# Patient Record
Sex: MALE | Race: BLACK OR AFRICAN AMERICAN | NOT HISPANIC OR LATINO | URBAN - METROPOLITAN AREA
[De-identification: names, ages, dates, MRNs, and addresses within clinical notes are randomized per-mention and may not be internally consistent; named-entity substitution may affect disease eponyms.]

---

## 2018-07-21 ENCOUNTER — OFFICE VISIT (OUTPATIENT)
Dept: URGENT CARE | Facility: CLINIC | Age: 48
End: 2018-07-21
Payer: COMMERCIAL

## 2018-07-21 VITALS
TEMPERATURE: 97 F | WEIGHT: 205 LBS | BODY MASS INDEX: 28.7 KG/M2 | HEART RATE: 74 BPM | DIASTOLIC BLOOD PRESSURE: 96 MMHG | SYSTOLIC BLOOD PRESSURE: 143 MMHG | OXYGEN SATURATION: 99 % | HEIGHT: 71 IN

## 2018-07-21 DIAGNOSIS — M25.562 PAIN AND SWELLING OF LEFT KNEE: ICD-10-CM

## 2018-07-21 DIAGNOSIS — M25.462 PAIN AND SWELLING OF LEFT KNEE: ICD-10-CM

## 2018-07-21 DIAGNOSIS — I10 ELEVATED BLOOD PRESSURE READING WITH DIAGNOSIS OF HYPERTENSION: ICD-10-CM

## 2018-07-21 DIAGNOSIS — M10.9 ACUTE GOUT OF LEFT KNEE, UNSPECIFIED CAUSE: Primary | ICD-10-CM

## 2018-07-21 PROCEDURE — 96372 THER/PROPH/DIAG INJ SC/IM: CPT | Mod: S$GLB,,, | Performed by: NURSE PRACTITIONER

## 2018-07-21 PROCEDURE — 99204 OFFICE O/P NEW MOD 45 MIN: CPT | Mod: 25,S$GLB,, | Performed by: NURSE PRACTITIONER

## 2018-07-21 PROCEDURE — 3008F BODY MASS INDEX DOCD: CPT | Mod: CPTII,S$GLB,, | Performed by: NURSE PRACTITIONER

## 2018-07-21 RX ORDER — INDOMETHACIN 25 MG/1
25 CAPSULE ORAL 3 TIMES DAILY
Qty: 15 CAPSULE | Refills: 0 | Status: SHIPPED | OUTPATIENT
Start: 2018-07-21 | End: 2018-07-26

## 2018-07-21 RX ORDER — AMLODIPINE AND BENAZEPRIL HYDROCHLORIDE 10; 20 MG/1; MG/1
1 CAPSULE ORAL DAILY
COMMUNITY

## 2018-07-21 RX ORDER — BETAMETHASONE SODIUM PHOSPHATE AND BETAMETHASONE ACETATE 3; 3 MG/ML; MG/ML
6 INJECTION, SUSPENSION INTRA-ARTICULAR; INTRALESIONAL; INTRAMUSCULAR; SOFT TISSUE
Status: COMPLETED | OUTPATIENT
Start: 2018-07-21 | End: 2018-07-21

## 2018-07-21 RX ORDER — COLCHICINE 0.6 MG/1
TABLET ORAL
Qty: 3 TABLET | Refills: 1 | Status: CANCELLED | OUTPATIENT
Start: 2018-07-21

## 2018-07-21 RX ADMIN — BETAMETHASONE SODIUM PHOSPHATE AND BETAMETHASONE ACETATE 6 MG: 3; 3 INJECTION, SUSPENSION INTRA-ARTICULAR; INTRALESIONAL; INTRAMUSCULAR; SOFT TISSUE at 02:07

## 2018-07-21 NOTE — PROGRESS NOTES
"Subjective:       Patient ID: John Ann is a 47 y.o. male.    Vitals:  height is 5' 11" (1.803 m) and weight is 93 kg (205 lb). His temperature is 97.1 °F (36.2 °C). His blood pressure is 143/96 (abnormal) and his pulse is 74. His oxygen saturation is 99%.     Chief Complaint: Knee Pain (left knee)    Pt presents with left knee pain. Pt states pain started 2 days ago on Thursday. Pt denies any injury to his ear. Pt states he has gout occasionally but he didn't bring his medication for it.      Knee Pain    The incident occurred 2 days ago. There was no injury mechanism.     Review of Systems   Constitution: Negative for chills and fever.   HENT: Negative for sore throat.    Eyes: Negative for blurred vision.   Cardiovascular: Negative for chest pain.   Respiratory: Negative for shortness of breath.    Skin: Negative for rash.   Musculoskeletal: Positive for joint pain and joint swelling. Negative for back pain.   Gastrointestinal: Negative for abdominal pain, diarrhea, nausea and vomiting.   Neurological: Negative for headaches.   Psychiatric/Behavioral: The patient is not nervous/anxious.        Objective:      Physical Exam   Constitutional: He is oriented to person, place, and time. He appears well-developed and well-nourished. He is cooperative.  Non-toxic appearance. He does not have a sickly appearance. He does not appear ill. No distress.   HENT:   Head: Normocephalic and atraumatic.   Right Ear: Hearing, tympanic membrane, external ear and ear canal normal.   Left Ear: Hearing, tympanic membrane, external ear and ear canal normal.   Nose: Nose normal. No mucosal edema, rhinorrhea or nasal deformity. No epistaxis. Right sinus exhibits no maxillary sinus tenderness and no frontal sinus tenderness. Left sinus exhibits no maxillary sinus tenderness and no frontal sinus tenderness.   Mouth/Throat: Uvula is midline, oropharynx is clear and moist and mucous membranes are normal. No trismus in the jaw. Normal " dentition. No uvula swelling. No posterior oropharyngeal erythema.   Eyes: Conjunctivae, EOM and lids are normal. Pupils are equal, round, and reactive to light. Right eye exhibits no discharge. Left eye exhibits no discharge. No scleral icterus.   Sclera clear bilat   Neck: Trachea normal, normal range of motion, full passive range of motion without pain and phonation normal. Neck supple.   Cardiovascular: Normal rate, regular rhythm, S1 normal, S2 normal, normal heart sounds, intact distal pulses and normal pulses.    Pulmonary/Chest: Effort normal and breath sounds normal. No respiratory distress. He has no decreased breath sounds. He has no wheezes. He has no rhonchi. He has no rales.   Abdominal: Soft. Normal appearance and bowel sounds are normal. He exhibits no distension, no pulsatile midline mass and no mass. There is no tenderness.   Musculoskeletal: He exhibits no edema or deformity.        Left knee: He exhibits decreased range of motion, swelling and effusion. He exhibits no ecchymosis, no deformity, no laceration, no erythema, normal alignment, no LCL laxity, normal patellar mobility, no bony tenderness, normal meniscus and no MCL laxity. Tenderness found. Patellar tendon tenderness noted. No medial joint line, no lateral joint line, no MCL and no LCL tenderness noted.        Legs:  Left knee with mild effusion and warthm noted to the affected joint.  There is decrease in ROM secondary to pain and swelling.  See attached picture.    Neurological: He is alert and oriented to person, place, and time. He exhibits normal muscle tone. Coordination normal.   Skin: Skin is warm, dry and intact. He is not diaphoretic. No pallor.   Psychiatric: He has a normal mood and affect. His speech is normal and behavior is normal. Judgment and thought content normal. Cognition and memory are normal.   Nursing note and vitals reviewed.            Assessment:       1. Acute gout of left knee, unspecified cause    2. Pain  and swelling of left knee    3. Elevated blood pressure reading with diagnosis of hypertension        Plan:       47 year old AAM with left knee pain and swelling.  Denies fall or injury to knee.  Reports history of gout about 6 months ago with same symptoms.  Patient reports a recent history of increase alcohol and seafood ingestion while here for his [employer] conference.  Patient reports he has been taking his blood pressure meds but had not taken his morning dose as of today.  States he was waiting to take his dose after eating.  Patient adamant about receiving the steroid shot.  Discussed case with Dr. Benavidez who agreed with Celestone and Indomethacin as treatment for this flare up.     Acute gout of left knee, unspecified cause  -     indomethacin (INDOCIN) 25 MG capsule; Take 1 capsule (25 mg total) by mouth 3 (three) times daily. for 5 days  Dispense: 15 capsule; Refill: 0    Pain and swelling of left knee  -     betamethasone acetate-betamethasone sodium phosphate injection 6 mg; Inject 1 mL (6 mg total) into the muscle one time.    Elevated blood pressure reading with diagnosis of hypertension    Other orders  -     Cancel: colchicine 0.6 mg tablet; Take 2 tablets now then 1 tablet one hour later.  Dispense: 3 tablet; Refill: 1        - Take Blood Pressure medicine as previously prescribed.

## 2018-07-21 NOTE — PATIENT INSTRUCTIONS
Orthopedic Follow up Discharge Instructions    If your condition worsens or fails to improve we recommend that you receive another evaluation at the ER immediately or contact your PCP to discuss your concerns. You must understand that you've received an urgent care treatment only and that you may be released before all your medical problems are known or treated. You the patient will arrange for follouwp care as instructed.   If you were prescribed a narcotic or muscle relaxant do not drive or operate heavy machinery while taking these medications   If you were given a prescription NSAID here do not also take any over the counter NSAID like ibuprofen, aleve, advil, motrin etc   RICE which means rest, ice compression and elevation are helpful.   Follow up with your PCP if you continue with pain.      Elevated Blood Pressure Reading  Your Blood Pressure was elevated on today.  You should recheck your blood pressure twice a day for the next 2 weeks while follow up with your PCP at your next visit regarding today's reading.    If you have any chest pain, shortness or breath, palpitations, headache, visual disturbances, ect, you should go to the Er.    In the meantime, we recommend you schedule an appointment with your PCP in the next 2-3 days for a recheck of your blood pressure.     Steroid Injection  You received a steroid shot today - As discussed, this can elevate your blood pressure, elevate your blood sugar, water weight gain, nervous energy, redness to the face and dimpling of the skin where the shot goes in.     Gout Diet  Gout is a painful condition caused by an excess of uric acid, a waste product made by the body. Uric acid forms crystals that collect in the joints. The immune response to these crystals brings on symptoms of joint pain and swelling. This is called a gout attack. Often, medications and diet changes are combined to manage gout. Below are some guidelines for changing  your diet to help you manage gout and prevent attacks. Your health care provider will help you determine the best eating plan for you.     Eating to manage gout  Weight loss for those who are overweight may help reduce gout attacks.  Eat less of these foods  Eating too many foods containing purines may raise the levels of uric acid in your body. This raises your risk for a gout attack. Try to limit these foods and drinks:  · Alcohol, such as beer and red wine. You may be told to avoid alcohol completely.  · Soft drinks that contain sugar or high fructose corn syrup  · Certain fish, including anchovies, sardines, fish eggs, and herring  · Shellfish  · Certain meats, such as red meat, hot dogs, luncheon meats, and turkey  · Organ meats, such as liver, kidneys, and sweetbreads  · Legumes, such as dried beans and peas  · Other high fat foods such as gravy, whole milk, and high fat cheeses  · Vegetables such as asparagus, cauliflower, spinach, and mushrooms used to be thought to contribute to an increased risk for a gout attack, but recent studies show that high purine vegetables don't increase the risk for a gout attack.  Eat more of these foods  Other foods may be helpful for people with gout. Add some of these foods to your diet:  · Cherries contain chemicals that may lower uric acid.  · Omega fatty acids. These are found in some fatty fish such as salmon, certain oils (flax, olive, or nut), and nuts themselves. Omega fatty acids may help prevent inflammation due to gout.  · Dairy products that are low-fat or fat-free, such as cheese and yogurt  · Complex carbohydrate foods, including whole grains, brown rice, oats, and beans  · Coffee, in moderation  · Water, approximately 64 ounces per day  Follow-up care  Follow up with your healthcare provider as advised.  When to seek medical advice  Call your healthcare provider right away if any of these occur:  · Return of gout symptoms, usually at night:  · Severe pain,  swelling, and heat in a joint, especially the base of the big toe  · Affected joint is hard to move  · Skin of the affected joint is purple or red  · Fever of 100.4°F (38°C) or higher  · Pain that doesn't get better even with prescribed medicine   Date Last Reviewed: 1/12/2016  © 4019-2556 [employer]. [address] [place] [address], [place] [address]. All rights reserved. This information is not intended as a substitute for professional medical care. Always follow your healthcare professional's instructions.        Eating to Prevent Gout  Gout is a painful form of arthritis caused by an excess of uric acid. This is a waste product made by the body. It builds up in the body and forms crystals that collect in the joints, bringing on a gout attack. Alcohol and certain foods can trigger a gout attack. Below are some guidelines for changing your diet to help you manage gout. Your healthcare provider can work with you to determine the best eating plan for you. Know that diet is only one part of managing gout. Take your medicines as prescribed and follow the other guidelines your healthcare provider has given you.  Foods to limit  Eating too many foods containing purines may increase the levels of uric acid in your body and increase your risk for a gout attack. It may be best to limit these high-purine foods:  · Alcohol (beer, red wine). You may be told to avoid alcohol completely.  · Certain fish (anchovies, sardines, fish roes, herring, tuna, mussels, codfish, scallops, trout, and jessika)  · Certain meats (red meat, processed meat, rizvi, turkey, wild game, and goose)  · Sauces and gravies made with meat  · Organ meats (such as liver, kidneys, sweetbreads, and tripe)  · Legumes (such as dried beans, peas)  · Mushrooms, spinach, asparagus, and cauliflower  · Yeast and yeast extract supplements  Foods to try  Some foods may be helpful for people with gout. You may want to try adding some of the following foods to  your diet:  · Dark berries: These include blueberries, blackberries, and cherries. These berries contain chemicals that may lower uric acid.  · Tofu: Tofu, which is made from soy, is a good source of protein. Studies have shown that it may be a better choice than meat for people with gout.  · Omega fatty acids: These acids are found in fatty fish (such as salmon), certain oils (such as flax, olive, or nut oils), or nuts. They may help prevent inflammation due to gout.  The following guidelines are recommended by the American Medical Association for people with gout. Your diet should be:  · High in fiber, whole grains, fruits, and vegetables.  · Low in protein (15% of calories should come from protein. Choose lean sources such as soy, lean meats, and poultry).  · Low in fat (no more than 30% of calories should come from fat, with only 10% coming from animal fat).   Date Last Reviewed: 6/17/2015 © 2000-2017 [employer]. [address] [place] [address]. All rights reserved. This information is not intended as a substitute for professional medical care. Always follow your healthcare professional's instructions.        What Is Gout?  Gout is a disease that affects the joints. Left untreated, it can lead to painful foot and joint deformities and even kidney problems. But, by treating gout early, you can relieve pain and help prevent future problems. Gout can usually be treated with medication and proper diet. In severe cases, surgery may be needed.  What causes gout?  Gout is caused by an excess of uric acid (a waste product made by the body). Uric acid is excreted by the kidneys. If the uric acid level in your blood rises too high, the uric acid may form crystals that collect in the joints, bringing on a gout attack. If you have many gout attacks, crystals may form large deposits called tophi. Tophi can damage joints and cause deformity.  Who is at risk for gout?  Men are more likely to have gout  "than women. But women can also be affected, mostly after menopause. Some health problems, such as obesity and high cholesterol, make gout more likely. And some medications, such as diuretics (water pills), can trigger a gout attack. People who drink a lot of alcohol are at high risk for gout. Certain foods can also trigger a gout attack.  Substances that may trigger a gout attack  To help prevent a gout attack, avoid these foods:  · Alcohol (particularly beer, but also red wine and spirits)  · Certain meats (red meat, processed meat, turkey)  · Organ meats (kidney, liver, sweetbread)  · Shellfish (lobster, crab, shrimp, scallop, mussel)  · Certain fish (anchovy, sardine, herring, mackerel)   Treatment  · Lifestyle changes, including weight loss, exercise, and quitting tobacco use  · Reducing consumption of the food groups above as well as high fructose corn syrup, found in many foods including sodas and energy drinks  · Changing non-essential medications that may contribute to gout (such as thiazide diuretics--"water pills")  · Medications to reduce the amount of uric acid in the blood, such as allopurinol or others  · Medications to treat acute gout attacks, including NSAIDs (nonsteroidal anti-inflammatory medicines), steroids, and colchicine  Date Last Reviewed: 2/1/2016  © 8987-4517 [employer]. [address] [place] [address], [place] [address]. All rights reserved. This information is not intended as a substitute for professional medical care. Always follow your healthcare professional's instructions.        "

## 2022-04-12 ENCOUNTER — OFFICE VISIT (OUTPATIENT)
Dept: URBAN - METROPOLITAN AREA SURGERY CENTER 16 | Facility: SURGERY CENTER | Age: 52
End: 2022-04-12